# Patient Record
Sex: FEMALE | Race: WHITE | Employment: FULL TIME | URBAN - METROPOLITAN AREA
[De-identification: names, ages, dates, MRNs, and addresses within clinical notes are randomized per-mention and may not be internally consistent; named-entity substitution may affect disease eponyms.]

---

## 2018-01-26 ENCOUNTER — HOSPITAL ENCOUNTER (OUTPATIENT)
Dept: MAMMOGRAPHY | Age: 53
Discharge: HOME | End: 2018-01-26
Attending: OBSTETRICS & GYNECOLOGY | Admitting: OBSTETRICS & GYNECOLOGY

## 2018-01-26 DIAGNOSIS — Z12.31 ENCOUNTER FOR SCREENING MAMMOGRAM FOR MALIGNANT NEOPLASM OF BREAST: ICD-10-CM

## 2018-07-03 ENCOUNTER — TELEPHONE (OUTPATIENT)
Dept: PULMONOLOGY | Age: 53
End: 2018-07-03

## 2019-06-21 ENCOUNTER — HOSPITAL ENCOUNTER (OUTPATIENT)
Dept: WOMENS IMAGING | Age: 54
Discharge: HOME | End: 2019-06-21
Payer: COMMERCIAL

## 2019-06-21 DIAGNOSIS — Z12.31 VISIT FOR SCREENING MAMMOGRAM: ICD-10-CM

## 2019-06-21 PROCEDURE — 77063 BREAST TOMOSYNTHESIS BI: CPT

## 2020-08-17 ENCOUNTER — HOSPITAL ENCOUNTER (OUTPATIENT)
Dept: WOMENS IMAGING | Age: 55
Discharge: HOME | End: 2020-08-17
Payer: COMMERCIAL

## 2020-08-17 PROCEDURE — 77063 BREAST TOMOSYNTHESIS BI: CPT

## 2022-02-18 ENCOUNTER — OFFICE VISIT (OUTPATIENT)
Dept: FAMILY MEDICINE CLINIC | Age: 57
End: 2022-02-18
Payer: COMMERCIAL

## 2022-02-18 VITALS
HEART RATE: 69 BPM | WEIGHT: 160 LBS | OXYGEN SATURATION: 98 % | DIASTOLIC BLOOD PRESSURE: 86 MMHG | SYSTOLIC BLOOD PRESSURE: 137 MMHG | BODY MASS INDEX: 26.66 KG/M2 | HEIGHT: 65 IN

## 2022-02-18 DIAGNOSIS — Z23 NEED FOR SHINGLES VACCINE: ICD-10-CM

## 2022-02-18 DIAGNOSIS — Z12.11 SCREEN FOR COLON CANCER: ICD-10-CM

## 2022-02-18 DIAGNOSIS — Z13.29 SCREENING FOR THYROID DISORDER: ICD-10-CM

## 2022-02-18 DIAGNOSIS — Z00.00 ANNUAL PHYSICAL EXAM: ICD-10-CM

## 2022-02-18 DIAGNOSIS — F51.01 PRIMARY INSOMNIA: ICD-10-CM

## 2022-02-18 DIAGNOSIS — Z13.0 SCREENING FOR DISORDER OF BLOOD AND BLOOD-FORMING ORGANS: ICD-10-CM

## 2022-02-18 DIAGNOSIS — Z13.21 ENCOUNTER FOR VITAMIN DEFICIENCY SCREENING: ICD-10-CM

## 2022-02-18 DIAGNOSIS — Z12.31 ENCOUNTER FOR SCREENING MAMMOGRAM FOR MALIGNANT NEOPLASM OF BREAST: ICD-10-CM

## 2022-02-18 DIAGNOSIS — Z13.220 SCREENING FOR CHOLESTEROL LEVEL: ICD-10-CM

## 2022-02-18 DIAGNOSIS — H61.23 BILATERAL HEARING LOSS DUE TO CERUMEN IMPACTION: ICD-10-CM

## 2022-02-18 DIAGNOSIS — Z76.89 ENCOUNTER TO ESTABLISH CARE: Primary | ICD-10-CM

## 2022-02-18 DIAGNOSIS — Z13.1 SCREENING FOR DIABETES MELLITUS: ICD-10-CM

## 2022-02-18 PROCEDURE — 69210 REMOVE IMPACTED EAR WAX UNI: CPT | Performed by: NURSE PRACTITIONER

## 2022-02-18 PROCEDURE — 90750 HZV VACC RECOMBINANT IM: CPT | Performed by: NURSE PRACTITIONER

## 2022-02-18 PROCEDURE — 99386 PREV VISIT NEW AGE 40-64: CPT | Performed by: NURSE PRACTITIONER

## 2022-02-18 PROCEDURE — 36415 COLL VENOUS BLD VENIPUNCTURE: CPT | Performed by: NURSE PRACTITIONER

## 2022-02-18 PROCEDURE — 90471 IMMUNIZATION ADMIN: CPT | Performed by: NURSE PRACTITIONER

## 2022-02-18 ASSESSMENT — ENCOUNTER SYMPTOMS
SORE THROAT: 0
SHORTNESS OF BREATH: 0
EYE REDNESS: 0
COUGH: 0
COLOR CHANGE: 0
TROUBLE SWALLOWING: 0
EYE ITCHING: 0
RHINORRHEA: 0
APNEA: 0
CHOKING: 0
NAUSEA: 0
BACK PAIN: 0
EYE DISCHARGE: 0
GASTROINTESTINAL NEGATIVE: 1
SINUS PRESSURE: 0
DIARRHEA: 0
CONSTIPATION: 0
RESPIRATORY NEGATIVE: 1
CHEST TIGHTNESS: 0
BLOOD IN STOOL: 0
ALLERGIC/IMMUNOLOGIC NEGATIVE: 1
VOMITING: 0
PHOTOPHOBIA: 0
WHEEZING: 0
ABDOMINAL PAIN: 0
STRIDOR: 0
EYE PAIN: 0

## 2022-02-18 ASSESSMENT — PATIENT HEALTH QUESTIONNAIRE - PHQ9
1. LITTLE INTEREST OR PLEASURE IN DOING THINGS: 0
SUM OF ALL RESPONSES TO PHQ QUESTIONS 1-9: 0
SUM OF ALL RESPONSES TO PHQ9 QUESTIONS 1 & 2: 0
SUM OF ALL RESPONSES TO PHQ QUESTIONS 1-9: 0
2. FEELING DOWN, DEPRESSED OR HOPELESS: 0

## 2022-02-18 NOTE — PROGRESS NOTES
Frankie Kolb  YOB: 1965    Date of Service:  2/18/2022    Chief Complaint:   Frankie Kolb is a 60 y.o. female who presents for complete physical examination. HPI: Jorge Coronado presents to the office for a complete physical examination and to establish care. She admits to taking Ambien 10 mg nightly as needed for insomnia. She admits to her old OB/GYN prescribing it for her, but her OB/GYN retired about 5 years ago. She states she \"saved\" her Ambien and only has a few left. She has never tried anything besides Ambien for insomnia. She is not interested in trying anything else for insomnia. She is aware the office does not prescribe controlled substances.      Wt Readings from Last 3 Encounters:   02/18/22 160 lb (72.6 kg)     BP Readings from Last 3 Encounters:   02/18/22 137/86       Patient Active Problem List   Diagnosis    Insomnia       Care:  Health Maintenance   Topic Date Due    Depression Screen  Never done    Cervical cancer screen  Never done    Diabetes screen  Never done    Lipid screen  Never done    Colorectal Cancer Screen  Never done    Shingles Vaccine (2 of 2) 04/15/2022    Breast cancer screen  08/17/2022    DTaP/Tdap/Td vaccine (2 - Td or Tdap) 05/13/2029    Flu vaccine  Completed    COVID-19 Vaccine  Completed    Hepatitis A vaccine  Aged Out    Hepatitis B vaccine  Aged Out    Hib vaccine  Aged Out    Meningococcal (ACWY) vaccine  Aged Out    Pneumococcal 0-64 years Vaccine  Aged Out    Hepatitis C screen  Discontinued    HIV screen  Discontinued      Hx abnormal PAP: yes - 25 years ago; negative pap since then  Self-breast exams: yes  Previous DEXA scan: no  Exercise: no regular exercise  Diet: Moderately healthy  use of seat belts, use of home smoke detectors    Lipid panel:No results found for: CHOL, TRIG, HDL, LDLCALC, LDLDIRECT       Immunization History   Administered Date(s) Administered    COVID-19, Pfizer Purple top, DILUTE for use, 12+ yrs, 30mcg/0.3mL dose 02/13/2021, 03/06/2021, 11/04/2021    Influenza, MDCK Quadv, IM, PF (Flucelvax 2 yrs and older) 10/09/2019    Influenza, Quadv, IM, PF (6 mo and older Fluzone, Flulaval, Fluarix, and 3 yrs and older Afluria) 10/28/2021    Tdap (Boostrix, Adacel) 05/13/2019    Zoster Recombinant (Shingrix) 02/18/2022       No Known Allergies  No outpatient medications have been marked as taking for the 2/18/22 encounter (Office Visit) with TAURUS Mazariegos CNP. Past Medical History:   Diagnosis Date    Insomnia      Past Surgical History:   Procedure Laterality Date    WISDOM TOOTH EXTRACTION Bilateral 1981     Family History   Problem Relation Age of Onset    Diabetes Father     High Blood Pressure Sister      Social History     Socioeconomic History    Marital status:      Spouse name: Not on file    Number of children: Not on file    Years of education: Not on file    Highest education level: Not on file   Occupational History    Not on file   Tobacco Use    Smoking status: Never Smoker    Smokeless tobacco: Never Used   Vaping Use    Vaping Use: Never used   Substance and Sexual Activity    Alcohol use: Yes     Alcohol/week: 7.0 standard drinks     Types: 7 Cans of beer per week    Drug use: Not Currently    Sexual activity: Not on file   Other Topics Concern    Not on file   Social History Narrative    Not on file     Social Determinants of Health     Financial Resource Strain:     Difficulty of Paying Living Expenses: Not on file   Food Insecurity:     Worried About Running Out of Food in the Last Year: Not on file    Rogers of Food in the Last Year: Not on file   Transportation Needs:     Lack of Transportation (Medical): Not on file    Lack of Transportation (Non-Medical):  Not on file   Physical Activity:     Days of Exercise per Week: Not on file    Minutes of Exercise per Session: Not on file   Stress:     Feeling of Stress : Not on file   Social Connections:     Frequency of Communication with Friends and Family: Not on file    Frequency of Social Gatherings with Friends and Family: Not on file    Attends Yarsanism Services: Not on file    Active Member of Clubs or Organizations: Not on file    Attends Club or Organization Meetings: Not on file    Marital Status: Not on file   Intimate Partner Violence:     Fear of Current or Ex-Partner: Not on file    Emotionally Abused: Not on file    Physically Abused: Not on file    Sexually Abused: Not on file   Housing Stability:     Unable to Pay for Housing in the Last Year: Not on file    Number of [place] in the Last Year: Not on file    Unstable Housing in the Last Year: Not on file       Review of Systems:  Review of Systems   Constitutional: Negative. Negative for activity change, appetite change, chills, diaphoresis, fatigue, fever and unexpected weight change. HENT: Negative. Negative for ear pain, rhinorrhea, sinus pressure, sneezing, sore throat and trouble swallowing. Eyes: Negative for photophobia, pain, discharge, redness, itching and visual disturbance. Respiratory: Negative. Negative for apnea, cough, choking, chest tightness, shortness of breath, wheezing and stridor. Cardiovascular: Negative for chest pain, palpitations and leg swelling. Gastrointestinal: Negative. Negative for abdominal pain, blood in stool, constipation, diarrhea, nausea and vomiting. Genitourinary: Negative. Negative for decreased urine volume, difficulty urinating, dysuria, enuresis, flank pain, frequency, genital sores, hematuria and urgency. Musculoskeletal: Negative. Negative for arthralgias, back pain, gait problem, joint swelling, myalgias, neck pain and neck stiffness. Skin: Negative. Negative for color change, pallor, rash and wound. Allergic/Immunologic: Negative. Neurological: Negative. Negative for dizziness, facial asymmetry, weakness, light-headedness and headaches. Psychiatric/Behavioral: Positive for sleep disturbance. Negative for agitation, behavioral problems, confusion, decreased concentration, dysphoric mood, hallucinations, self-injury and suicidal ideas. The patient is not nervous/anxious and is not hyperactive. Physical Exam:   Vitals:    02/18/22 1335   BP: 137/86   Site: Right Upper Arm   Position: Sitting   Cuff Size: Medium Adult   Pulse: 69   SpO2: 98%   Weight: 160 lb (72.6 kg)   Height: 5' 5\" (1.651 m)     Body mass index is 26.63 kg/m². Physical Exam  Vitals and nursing note reviewed. Constitutional:       General: She is not in acute distress. Appearance: Normal appearance. She is well-developed. She is not diaphoretic. HENT:      Head: Normocephalic and atraumatic. Right Ear: Tympanic membrane, ear canal and external ear normal. There is no impacted cerumen. Left Ear: Tympanic membrane, ear canal and external ear normal. There is no impacted cerumen. Nose: Nose normal. No congestion or rhinorrhea. Mouth/Throat:      Mouth: Mucous membranes are moist.      Pharynx: Oropharynx is clear. No oropharyngeal exudate or posterior oropharyngeal erythema. Eyes:      General: No scleral icterus. Right eye: No discharge. Left eye: No discharge. Conjunctiva/sclera: Conjunctivae normal.   Neck:      Vascular: No carotid bruit. Trachea: No tracheal deviation. Cardiovascular:      Rate and Rhythm: Normal rate and regular rhythm. Pulses: Normal pulses. Heart sounds: Normal heart sounds. No murmur heard. No friction rub. No gallop. Pulmonary:      Effort: Pulmonary effort is normal. No respiratory distress. Breath sounds: Normal breath sounds. No stridor. No wheezing, rhonchi or rales. Chest:      Chest wall: No tenderness. Abdominal:      General: Bowel sounds are normal. There is no distension. Palpations: Abdomen is soft. There is no mass. Tenderness:  There is no abdominal tenderness. There is no guarding or rebound. Hernia: No hernia is present. Musculoskeletal:         General: No swelling, tenderness, deformity or signs of injury. Normal range of motion. Cervical back: Normal range of motion and neck supple. No rigidity. No muscular tenderness. Right lower leg: No edema. Left lower leg: No edema. Lymphadenopathy:      Cervical: No cervical adenopathy. Skin:     General: Skin is warm and dry. Capillary Refill: Capillary refill takes less than 2 seconds. Coloration: Skin is not jaundiced or pale. Findings: No bruising, erythema, lesion or rash. Neurological:      General: No focal deficit present. Mental Status: She is alert and oriented to person, place, and time. Mental status is at baseline. Cranial Nerves: No cranial nerve deficit. Sensory: No sensory deficit. Motor: No weakness or abnormal muscle tone. Coordination: Coordination normal.      Gait: Gait normal.      Deep Tendon Reflexes: Reflexes are normal and symmetric. Reflexes normal.   Psychiatric:         Mood and Affect: Mood normal.         Behavior: Behavior normal.         Thought Content: Thought content normal.         Judgment: Judgment normal.           Assessment/Plan:    Mike Dillard was seen today for establish care.     Diagnoses and all orders for this visit:    Encounter to establish care    Annual physical exam  -     CBC with Auto Differential  -     Comprehensive Metabolic Panel  -     Lipid Panel  -     Vitamin D 25 Hydroxy  -     Vitamin B12 & Folate  -     TSH with Reflex  -     Hemoglobin A1C    Bilateral hearing loss due to cerumen impaction  -     55086 - GA REMOVE IMPACTED EAR WAX    Primary insomnia    Need for shingles vaccine    Screening for disorder of blood and blood-forming organs  -     CBC with Auto Differential  -     Comprehensive Metabolic Panel    Screening for cholesterol level  -     Lipid Panel    Encounter for vitamin deficiency screening  -     Vitamin D 25 Hydroxy  -     Vitamin B12 & Folate    Screening for thyroid disorder  -     TSH with Reflex    Encounter for screening mammogram for malignant neoplasm of breast  -     PK DIGITAL SCREEN W OR WO CAD BILATERAL; Future    Screen for colon cancer  -     [facility]FL - Larisa Schreiber MD, Gastroenterology (ER[facility]), [facility]    Screening for diabetes mellitus  -     Hemoglobin A1C          Education:     Discussed using sunscreen for prevention of skin cancer and to reapply every 2 hours and after swimming, bathing, sweating. The nature of sun-induced photo-aging and skin cancers is discussed. Sunavoidance, protective clothing, and the use of 30-SPF sunscreens is advised. Observe closely for skin damage/changes, and call if such occurs. Discussed safe sex practices to prevent STI, HIV, andunplanned pregnancy. Discussed healthy eating habits and to exercise 5 days a week to elevate heart rate for 30-45 minutes at a time     Healthy Eating information given to patient in office and in AVS:   Eating healthy foods can help lower your risk for disease. Healthy foodgives you energy and keeps your heart strong, your brain active, your muscles working, and your bones strong. A healthy diet includes a variety of foods from the basic food groups: grains, vegetables, fruits, milk andmilk products, and meat and beans. Some people may eat more of their favorite foods from only one food group and, as a result, miss getting the nutrients they need. So, it is important to pay attention not only to what youeat but also to what you are missing from your diet. You can eat a healthy, balanced diet by making a few small changes. Follow-up care is a key part of yourtreatment and safety. Be sure to make and go to all appointments, and call your doctor if you are having problems. It's also a good idea to know your test results and keep a list of the medicines you take.   How can you care for yourself at home? Look at what you eat  · Keep a food diary for a week or two and record everything you eat or drink. Track the number of servings you eat from each food group. · For a balanced dietevery day, eat a variety of:  ¨ 6 or more ounce-equivalents of grains, such as cereals, breads,crackers, rice, or pasta, every day. An ounce-equivalent is 1 slice of bread, 1 cup of ready-to-eat cereal, or ½ cup of cooked rice, cooked pasta, or cooked cereal.  ¨ 2½ cups ofvegetables, especially:  § Dark-green vegetables such as broccoli and spinach. § Orange vegetables such as carrots and sweet potatoes. § Dry beans (such as ferrera and kidney beans) and peas (such as lentils). ¨ 2 cups of fresh, frozen, or canned fruit. A small apple or 1 banana or orange equals 1 cup. ¨ 3cups of nonfat or low-fat milk, yogurt, or other milk products. ¨ 5½ ounces of meat and beans, such as chicken, fish, lean meat, beans, nuts, and seeds. One egg, 1 tablespoon ofpeanut butter, ½ ounce nuts or seeds, or ¼ cup of cooked beans equals 1 ounce of meat. · Learnhow to read food labels for serving sizes and ingredients. Fast-food and convenience-food meals often contain few or no fruits or vegetables. Make sure you eat some fruits and vegetables to make the meal more nutritious. · Look at your food diary. For each food group, add up what you have eaten and then divide the total by the number of days. This will give you an idea of how much you are eating from [address] group. See if you can find some ways to change your diet to make it more healthy. Start small  · Do not try to make dramatic changes to your diet all at once. You might feel that you are missing out on your favoritefoods and then be more likely to fail. · Start slowly, and gradually change your habits. Try some of the following:  ¨ Use whole wheat bread instead of white bread. ¨ Use nonfat or low-fat milk instead of whole milk.   ¨ Eat brown rice instead of white rice, and eat whole wheat pasta instead of white-flour pasta. ¨ Try low-fat cheeses and low-fat yogurt. ¨ Add more fruits and vegetables to meals and have them for snacks. ¨ Add lettuce, tomato, cucumber, and onion to sandwiches. ¨ Add fruitto yogurt and cereal.  Enjoy food  · You can still eat your favorite foods. You just may need to eat less of them. If your favorite foods are high in fat, salt, and sugar, limit how often you eat them, but do not cut themout entirely. · Eat a wide variety of foods. Make healthy choices when eatingout  · The type of restaurant you choose can help you make healthy choices. Even fast-food chains arenow offering more low-fat or healthier choices on the menu. · Choose smaller portions, or take half of your meal home. · When eating out, try:  ¨ A veggie pizza with a whole wheat crust or grilled chicken (instead of sausage or pepperoni). ¨ Pasta with roasted vegetables, grilled chicken, or marinara sauce instead of cream sauce. ¨ A vegetable wrap or grilled chicken wrap. ¨ Broiled or poached food instead of fried or breaded items. Make healthy choiceseasy  · Buy packaged, prewashed, ready-to-eat fresh vegetables and fruits, such as baby carrots, saladmixes, and chopped or shredded broccoli and cauliflower. · Buy packaged, presliced fruits, such as melon or pineapple. · Choose 100% fruit orvegetable juice instead of soda. Limit juice intake to 4 to 6 oz (½ to ¾ cup) a day. Education given to patient in office and in AVS on Physical Activity:     The types of physical activity that can help you get fit and stay healthy include:  · Aerobic or \"cardio\" activities that make your heart beat faster and makeyou breathe harder, such as brisk walking, riding a bike, or running. Aerobic activities strengthen your heart and lungs and build up your endurance.   · Strength training activities that make your muscles work against, or \"resist,\" something, such as lifting weights or doing push-ups. These activities help tone and strengthen yourmuscles. · Stretches that allow you to move your joints and muscles through their full range of motion. Stretching helps you be more flexible and avoid injury. What are the benefits of physical activity? Being active is one of the best things you can do to get fit and stay healthy. It helps you to:  · Feelstronger and have more energy to do all the things you like to do. · Focus better at school or work and perform better in sports. · Feel, think,and sleep better. · Reach and stay at a healthy weight. · Lose fat and build lean muscle. · Lower yourrisk for serious health problems. · Keep your bones, muscles, and joints strong. Being fit lets you do more physicalactivity. And it lets you work out harder without as much effort. How can you make physical activity part of your life? Get at least 30 minutesof exercise on most days of the week. Walking is a good choice. You also may want to do other activities, such as running, swimming, cycling, or playing tennis or team sports. Pick activities that you like--ones that make your heart beat faster, your muscles stronger, and your muscles and joints more flexible. If you find more than one thing you like doing, do them all. You don't have to do the samething every day. Get your heart pumping every day. Any activity that makes your heart beat faster and keeps it at that rate for a while counts. are some great ways to get your heart beating faster:  · Go for a brisk walk, run, or bike ride. · Go for a hike or swim. · Go in-line skating. · Play a game of touch football, basketball, or soccer. · Ride a bike. · Play tennis or racquetball. · Climb stairs. Even some household chores can be aerobic--just do them at a faster pace. Vacuuming, raking or mowing the lawn, sweeping the garage, and washing and waxing the carall can help get your heart rate up. Strengthen your muscles during the week.  You don't have to lift heavy weights or grow big, bulky muscles toget stronger. Doing a few simple activities that make your muscles work against, or \"resist,\" something can help you get stronger. For example, you can:  · Do push-ups or sit-ups, which use your own body weight as resistance. · Lift weights or dumbbells or use stretch bands at homeor in a gym or community center. Stretch your muscles often. Stretching will help you as you become moreactive. It can help you stay flexible, loosen tight muscles, and avoid injury. It can also help improve your balance and posture and can be a great way to relax. Be sure to stretch the muscles you'll be using when [facility] out. It's best to warm your muscles slightly before you stretch them. Walk or do some other light aerobic activity for a few minutes, and then start stretching. When you stretch your muscles:  · Do it slowly. Stretching is not about going fast or making sudden movements. · Don't push or bounce during a stretch. · Hold each stretch for at least 15 to 30 seconds, if you can. You should feel a stretch in the muscle, but not pain. · Breathe out as you do the stretch. Then breathe in as you hold the stretch. Don't hold your breath. If you're worried about how more activitymight affect your health, have a checkup before you start. Follow any special advice your doctor gives you for getting a smart start.

## 2022-02-18 NOTE — PATIENT INSTRUCTIONS
diet and sunshine. · Get regular bone-building exercise. Walking, jogging, dancing, and lifting weights can make your bones stronger. · Limit alcohol to 2 drinks a day for men and 1 drink a day for women. · Don't smoke. Smoking can make bones thin faster. If you need help quitting, talk to your doctor about stop-smoking programs and medicines. These can increase your chances of quitting for good. When should you call for help? Watch closely for changes in your health, and be sure to contact your doctor if you have any problems. Where can you learn more? Go to [email or web address]://pi[employer]GO Technologiespepiceweb.[employer]. org and sign in to your [employer] account. Enter B147 in the [employer] box to learn more about \"Preventing Osteoporosis: Care Instructions. \"     If you do not have an account, please click on the \"Sign Up Now\" link. Current as of: September 8, 2021               Content Version: 13.1  © 2006-2021 [employer]. Care instructions adapted under license by [employer]. If you have questions about a medical condition or this instruction, always ask your healthcare professional. [place] [address] any warranty or liability for your use of this information. Patient Education        Recombinant Zoster (Shingles) Vaccine: What You Need to Know  Why get vaccinated? Recombinant zoster (shingles) vaccine can prevent shingles. Shingles (also called herpes zoster, or just zoster) is a painful skin rash, usually with blisters. In addition to the rash, shingles can cause fever, headache, chills, or upset stomach. More rarely, shingles can lead to pneumonia, hearing problems, blindness, brain inflammation (encephalitis), or death. The most common complication of shingles is long-term nerve pain called postherpetic neuralgia (PHN). PHN occurs in the areas where the shingles rash was, even after the rash clears up. It can last for months or years after the rash goes away.  The pain from PHN can be severe and debilitating. About 10 to 18% of people who get shingles will experience PHN. The risk of PHN increases with age. An older adult with shingles is more likely to develop PHN and have longer lasting and more severe pain than a younger person with shingles. Shingles is caused by the varicella zoster virus, the same virus that causes chickenpox. After you have chickenpox, the virus stays in your body and can cause shingles later in life. Shingles cannot be passed from one person to another, but the virus that causes shingles can spread and cause chickenpox in someone who had never had chickenpox or received chickenpox vaccine. Recombinant shingles vaccine  Recombinant shingles vaccine provides strong protection against shingles. By preventing shingles, recombinant shingles vaccine also protects against PHN. Recombinant shingles vaccine is the preferred vaccine for the prevention of shingles. However, a different vaccine, live shingles vaccine, may be used in some circumstances. The recombinant shingles vaccine is recommended for adults 50 years and older without serious immune problems. It is given as a two-dose series. This vaccine is also recommended for people who have already gotten another type of shingles vaccine, the live shingles vaccine. There is no live virus in this vaccine. Shingles vaccine may be given at the same time as other vaccines. Talk with your health care provider  Tell your vaccine provider if the person getting the vaccine:  · Has had an allergic reaction after a previous dose of recombinant shingles vaccine, or has any severe, life-threatening allergies. · Is pregnant or breastfeeding. · Is currently experiencing an episode of shingles. In some cases, your health care provider may decide to postpone shingles vaccination to a future visit. People with minor illnesses, such as a cold, may be vaccinated.  People who are moderately or severely ill should usually wait until they recover before getting recombinant shingles vaccine. Your health care provider can give you more information. Risks of a vaccine reaction  · A sore arm with mild or moderate pain is very common after recombinant shingles vaccine, affecting about 80% of vaccinated people. Redness and swelling can also happen at the site of the injection. · Tiredness, muscle pain, headache, shivering, fever, stomach pain, and nausea happen after vaccination in more than half of people who receive recombinant shingles vaccine. In clinical trials, about 1 out of 6 people who got recombinant zoster vaccine experienced side effects that prevented them from doing regular activities. Symptoms usually went away on their own in 2 to 3 days. You should still get the second dose of recombinant zoster vaccine even if you had one of these reactions after the first dose. People sometimes faint after medical procedures, including vaccination. Tell your provider if you feel dizzy or have vision changes or ringing in the ears. As with any medicine, there is a very remote chance of a vaccine causing a severe allergic reaction, other serious injury, or death. What if there is a serious problem? An allergic reaction could occur after the vaccinated person leaves the clinic. If you see signs of a severe allergic reaction (hives, swelling of the face and throat, difficulty breathing, a fast heartbeat, dizziness, or weakness), call 9-1-1 and get the person to the nearest hospital.  For other signs that concern you, call your health care provider. Adverse reactions should be reported to the Vaccine Adverse Event Reporting System (VAERS). Your health care provider will usually file this report, or you can do it yourself. Visit the VAERS website at www.vaers. hhs.gov or call 0-696.560.2968. VAERS is only for reporting reactions, and VAERS staff do not give medical advice. How can I learn more?   · Ask your health care provider. · Call your local or state health department. · Contact the Centers for Disease Control and Prevention (CDC):  ? Call 7-110.624.4530 (1-800-CDC-INFO) or  ? Visit CDC's website at www.cdc.gov/vaccines  Vaccine Information Statement  Recombinant Zoster Vaccine  10/30/2019  Department of [facility] and [facility] Center for Disease Control and Prevention  Many Vaccine Information Statements are available in [place] and other languages. See www.immunize.org/vis. Hojas de Información Sobre Vacunas están disponibles en Español y en muchos otros idiomas. Visite [place].si   Care instructions adapted under license by [facility] ([facility]). If you have questions about a medical condition or this instruction, always ask your healthcare professional. [address] any warranty or liability for your use of this information.

## 2022-02-19 LAB
A/G RATIO: 1.5 (ref 1.1–2.2)
ALBUMIN SERPL-MCNC: 4.8 G/DL (ref 3.4–5)
ALP BLD-CCNC: 73 U/L (ref 40–129)
ALT SERPL-CCNC: 17 U/L (ref 10–40)
ANION GAP SERPL CALCULATED.3IONS-SCNC: 12 MMOL/L (ref 3–16)
AST SERPL-CCNC: 19 U/L (ref 15–37)
BASOPHILS ABSOLUTE: 0 K/UL (ref 0–0.2)
BASOPHILS RELATIVE PERCENT: 0.6 %
BILIRUB SERPL-MCNC: 0.3 MG/DL (ref 0–1)
BUN BLDV-MCNC: 12 MG/DL (ref 7–20)
CALCIUM SERPL-MCNC: 9.8 MG/DL (ref 8.3–10.6)
CHLORIDE BLD-SCNC: 101 MMOL/L (ref 99–110)
CHOLESTEROL, TOTAL: 220 MG/DL (ref 0–199)
CO2: 26 MMOL/L (ref 21–32)
CREAT SERPL-MCNC: 0.8 MG/DL (ref 0.6–1.1)
EOSINOPHILS ABSOLUTE: 0.2 K/UL (ref 0–0.6)
EOSINOPHILS RELATIVE PERCENT: 3.3 %
ESTIMATED AVERAGE GLUCOSE: 99.7 MG/DL
FOLATE: 17.03 NG/ML (ref 4.78–24.2)
GFR AFRICAN AMERICAN: >60
GFR NON-AFRICAN AMERICAN: >60
GLUCOSE BLD-MCNC: 93 MG/DL (ref 70–99)
HBA1C MFR BLD: 5.1 %
HCT VFR BLD CALC: 43 % (ref 36–48)
HDLC SERPL-MCNC: 68 MG/DL (ref 40–60)
HEMOGLOBIN: 14.7 G/DL (ref 12–16)
LDL CHOLESTEROL CALCULATED: 133 MG/DL
LYMPHOCYTES ABSOLUTE: 1.6 K/UL (ref 1–5.1)
LYMPHOCYTES RELATIVE PERCENT: 29.7 %
MCH RBC QN AUTO: 31.8 PG (ref 26–34)
MCHC RBC AUTO-ENTMCNC: 34.2 G/DL (ref 31–36)
MCV RBC AUTO: 93.1 FL (ref 80–100)
MONOCYTES ABSOLUTE: 0.5 K/UL (ref 0–1.3)
MONOCYTES RELATIVE PERCENT: 9.6 %
NEUTROPHILS ABSOLUTE: 3.2 K/UL (ref 1.7–7.7)
NEUTROPHILS RELATIVE PERCENT: 56.8 %
PDW BLD-RTO: 13.1 % (ref 12.4–15.4)
PLATELET # BLD: 259 K/UL (ref 135–450)
PMV BLD AUTO: 8.5 FL (ref 5–10.5)
POTASSIUM SERPL-SCNC: 4 MMOL/L (ref 3.5–5.1)
RBC # BLD: 4.61 M/UL (ref 4–5.2)
SODIUM BLD-SCNC: 139 MMOL/L (ref 136–145)
TOTAL PROTEIN: 7.9 G/DL (ref 6.4–8.2)
TRIGL SERPL-MCNC: 94 MG/DL (ref 0–150)
TSH REFLEX: 1.05 UIU/ML (ref 0.27–4.2)
VITAMIN B-12: 549 PG/ML (ref 211–911)
VITAMIN D 25-HYDROXY: 18.5 NG/ML
VLDLC SERPL CALC-MCNC: 19 MG/DL
WBC # BLD: 5.6 K/UL (ref 4–11)

## 2022-02-20 DIAGNOSIS — E55.9 VITAMIN D DEFICIENCY: Primary | ICD-10-CM

## 2022-02-21 DIAGNOSIS — E55.9 VITAMIN D DEFICIENCY: ICD-10-CM

## 2022-02-21 DIAGNOSIS — E78.49 OTHER HYPERLIPIDEMIA: ICD-10-CM

## 2022-04-22 ENCOUNTER — HOSPITAL ENCOUNTER (OUTPATIENT)
Dept: WOMENS IMAGING | Age: 57
Discharge: HOME | End: 2022-04-22
Payer: COMMERCIAL

## 2022-04-22 VITALS — HEIGHT: 65 IN | BODY MASS INDEX: 25.83 KG/M2 | WEIGHT: 155 LBS

## 2022-04-22 DIAGNOSIS — Z12.31 ENCOUNTER FOR SCREENING MAMMOGRAM FOR MALIGNANT NEOPLASM OF BREAST: ICD-10-CM

## 2022-04-22 PROCEDURE — 77067 SCR MAMMO BI INCL CAD: CPT

## 2022-05-06 ENCOUNTER — NURSE ONLY (OUTPATIENT)
Dept: FAMILY MEDICINE CLINIC | Age: 57
End: 2022-05-06
Payer: COMMERCIAL

## 2022-05-06 DIAGNOSIS — Z23 NEED FOR SHINGLES VACCINE: Primary | ICD-10-CM

## 2022-05-06 PROCEDURE — 90750 HZV VACC RECOMBINANT IM: CPT | Performed by: NURSE PRACTITIONER

## 2022-05-06 PROCEDURE — 90471 IMMUNIZATION ADMIN: CPT | Performed by: NURSE PRACTITIONER

## 2022-08-04 DIAGNOSIS — E55.9 VITAMIN D DEFICIENCY: ICD-10-CM

## 2022-08-04 RX ORDER — METHOCARBAMOL 750 MG/1
TABLET ORAL
Qty: 12 CAPSULE | Refills: 1 | OUTPATIENT
Start: 2022-08-04

## 2022-08-04 NOTE — TELEPHONE ENCOUNTER
Only needed weekly vitamin D for a total of 6 months and then may start on vitamin D3 supplement of 1,000 to 2,000 units daily

## 2023-05-22 ENCOUNTER — HOSPITAL ENCOUNTER (OUTPATIENT)
Dept: WOMENS IMAGING | Age: 58
Discharge: HOME | End: 2023-05-22
Payer: COMMERCIAL

## 2023-05-22 VITALS — HEIGHT: 65 IN | WEIGHT: 140 LBS | BODY MASS INDEX: 23.32 KG/M2

## 2023-05-22 DIAGNOSIS — Z12.31 VISIT FOR SCREENING MAMMOGRAM: ICD-10-CM

## 2023-05-22 PROCEDURE — 77063 BREAST TOMOSYNTHESIS BI: CPT

## 2024-07-29 ENCOUNTER — HOSPITAL ENCOUNTER (OUTPATIENT)
Dept: WOMENS IMAGING | Age: 59
Discharge: HOME | End: 2024-07-29
Payer: COMMERCIAL

## 2024-07-29 VITALS — HEIGHT: 65 IN | WEIGHT: 150 LBS | BODY MASS INDEX: 24.99 KG/M2

## 2024-07-29 DIAGNOSIS — Z12.31 VISIT FOR SCREENING MAMMOGRAM: ICD-10-CM

## 2024-07-29 PROCEDURE — 77063 BREAST TOMOSYNTHESIS BI: CPT

## 2025-02-24 ENCOUNTER — OFFICE VISIT (OUTPATIENT)
Dept: FAMILY MEDICINE CLINIC | Age: 60
End: 2025-02-24
Payer: COMMERCIAL

## 2025-02-24 VITALS
OXYGEN SATURATION: 98 % | HEART RATE: 81 BPM | SYSTOLIC BLOOD PRESSURE: 122 MMHG | BODY MASS INDEX: 25.33 KG/M2 | WEIGHT: 152 LBS | DIASTOLIC BLOOD PRESSURE: 82 MMHG | HEIGHT: 65 IN

## 2025-02-24 DIAGNOSIS — Z13.29 SCREENING FOR THYROID DISORDER: ICD-10-CM

## 2025-02-24 DIAGNOSIS — Z13.0 SCREENING FOR DISORDER OF BLOOD AND BLOOD-FORMING ORGANS: ICD-10-CM

## 2025-02-24 DIAGNOSIS — Z00.00 ANNUAL PHYSICAL EXAM: ICD-10-CM

## 2025-02-24 DIAGNOSIS — E55.9 VITAMIN D DEFICIENCY: ICD-10-CM

## 2025-02-24 DIAGNOSIS — G43.709 CHRONIC MIGRAINE WITHOUT AURA WITHOUT STATUS MIGRAINOSUS, NOT INTRACTABLE: ICD-10-CM

## 2025-02-24 DIAGNOSIS — Z13.21 ENCOUNTER FOR VITAMIN DEFICIENCY SCREENING: ICD-10-CM

## 2025-02-24 DIAGNOSIS — Z13.1 SCREENING FOR DIABETES MELLITUS: ICD-10-CM

## 2025-02-24 DIAGNOSIS — Z13.220 SCREENING FOR CHOLESTEROL LEVEL: ICD-10-CM

## 2025-02-24 DIAGNOSIS — Z12.11 SCREEN FOR COLON CANCER: ICD-10-CM

## 2025-02-24 DIAGNOSIS — Z76.89 ENCOUNTER TO ESTABLISH CARE: Primary | ICD-10-CM

## 2025-02-24 DIAGNOSIS — H61.21 IMPACTED CERUMEN OF RIGHT EAR: ICD-10-CM

## 2025-02-24 DIAGNOSIS — Z23 NEED FOR INFLUENZA VACCINATION: ICD-10-CM

## 2025-02-24 PROCEDURE — 90661 CCIIV3 VAC ABX FR 0.5 ML IM: CPT | Performed by: NURSE PRACTITIONER

## 2025-02-24 PROCEDURE — 90471 IMMUNIZATION ADMIN: CPT | Performed by: NURSE PRACTITIONER

## 2025-02-24 PROCEDURE — 69210 REMOVE IMPACTED EAR WAX UNI: CPT | Performed by: NURSE PRACTITIONER

## 2025-02-24 PROCEDURE — 99386 PREV VISIT NEW AGE 40-64: CPT | Performed by: NURSE PRACTITIONER

## 2025-02-24 RX ORDER — METOCLOPRAMIDE 10 MG/1
10 TABLET ORAL DAILY PRN
Qty: 90 TABLET | Refills: 2 | Status: SHIPPED | OUTPATIENT
Start: 2025-02-24

## 2025-02-24 SDOH — ECONOMIC STABILITY: FOOD INSECURITY: WITHIN THE PAST 12 MONTHS, YOU WORRIED THAT YOUR FOOD WOULD RUN OUT BEFORE YOU GOT MONEY TO BUY MORE.: NEVER TRUE

## 2025-02-24 SDOH — ECONOMIC STABILITY: FOOD INSECURITY: WITHIN THE PAST 12 MONTHS, THE FOOD YOU BOUGHT JUST DIDN'T LAST AND YOU DIDN'T HAVE MONEY TO GET MORE.: NEVER TRUE

## 2025-02-24 ASSESSMENT — ENCOUNTER SYMPTOMS
ALLERGIC/IMMUNOLOGIC NEGATIVE: 1
SINUS PAIN: 0
VOICE CHANGE: 0
BLOOD IN STOOL: 0
COUGH: 0
NAUSEA: 0
GASTROINTESTINAL NEGATIVE: 1
ABDOMINAL PAIN: 0
VOMITING: 0
FACIAL SWELLING: 0
STRIDOR: 0
ANAL BLEEDING: 0
CONSTIPATION: 0
DIARRHEA: 0
RECTAL PAIN: 0
WHEEZING: 0
RHINORRHEA: 0
SHORTNESS OF BREATH: 0
SORE THROAT: 0
APNEA: 0
SINUS PRESSURE: 0
EYE PAIN: 0
CHOKING: 0
CHEST TIGHTNESS: 0
COLOR CHANGE: 0
RESPIRATORY NEGATIVE: 1
EYE ITCHING: 0
EYE DISCHARGE: 0
ABDOMINAL DISTENTION: 0
BACK PAIN: 0
TROUBLE SWALLOWING: 0
EYE REDNESS: 0
PHOTOPHOBIA: 0

## 2025-02-24 ASSESSMENT — PATIENT HEALTH QUESTIONNAIRE - PHQ9
SUM OF ALL RESPONSES TO PHQ QUESTIONS 1-9: 0
SUM OF ALL RESPONSES TO PHQ QUESTIONS 1-9: 0
2. FEELING DOWN, DEPRESSED OR HOPELESS: NOT AT ALL
1. LITTLE INTEREST OR PLEASURE IN DOING THINGS: NOT AT ALL
SUM OF ALL RESPONSES TO PHQ QUESTIONS 1-9: 0
SUM OF ALL RESPONSES TO PHQ QUESTIONS 1-9: 0
SUM OF ALL RESPONSES TO PHQ9 QUESTIONS 1 & 2: 0

## 2025-02-24 NOTE — PROGRESS NOTES
Carol Fair (:  1965) is a 59 y.o. female, here for evaluation of the following chief complaint(s):  Annual Exam (Pt is here for annual exam. )         Assessment & Plan  1. Health maintenance.  Her blood pressure readings are within the normal range at 122/82. She is advised to incorporate resistance exercises into her routine for bone health. A comprehensive set of physical labs will be conducted today to assess her glucose levels, kidney and liver function, electrolyte balance, blood count, thyroid function, and vitamin levels, including magnesium. She is advised to take Caltrate daily for bone health. A Pap smear will be scheduled in the future. She is encouraged to continue her annual mammograms. A screening colonoscopy will be ordered, and she will be provided with the necessary information. She is advised to apply sunscreen when outdoors for more than 15 to 20 minutes. An influenza vaccine will be administered today. Cerumen removal from the right ear will be performed during this visit.    2. Migraines.  She has a history of migraines triggered by certain foods. She is advised to call the office for a refill of her Reglan prescription if needed. She takes Ibuprofen 800 mg and a Reglan 10 mg once as needed for migraines.  She may get 1-2 migraines a month.     4. Insomnia.  She occasionally takes trazodone for sleep. She is advised to inform the office of the dosage so that a refill can be provided.    PROCEDURE  Cerumen removal from the right ear was performed during this visit.    Results    1. Encounter to establish care  2. Annual physical exam  -     CBC with Auto Differential  -     Comprehensive Metabolic Panel  -     Lipid Panel  -     Magnesium  -     Vitamin D 25 Hydroxy  -     Vitamin B12 & Folate  -     TSH reflex to FT4  3. Vitamin D deficiency  -     Vitamin D 25 Hydroxy  4. Impacted cerumen of right ear  -     REMOVE IMPACTED EAR WAX  5. Chronic migraine without aura without status

## 2025-02-24 NOTE — PROGRESS NOTES
Subjective:     Patient presents for evaluation of a plugged ear. Patient noticed the symptoms in the left ear, several weeks ago.   Patient denies ear pain.    Patient's medications, allergies, past medical, surgical, social and family histories were reviewed and updated as appropriate.    Review of Systems  Pertinent items are noted in HPI.      Objective:     Vitals:    02/24/25 1007   BP: 122/82   Site: Right Upper Arm   Position: Sitting   Cuff Size: Medium Adult   Pulse: 81   SpO2: 98%   Weight: 68.9 kg (152 lb)   Height: 1.651 m (5' 5\")       General: alert, appears stated age, and cooperative   Right Ear: Cerumen impaction   Left Ear:  normal appearance   After removal: normal bilaterally         Assessment:      Cerumen Impaction, without otitis externa.      Plan:      Cerumen removed by flushing after use of wax softener and currettage.  Care instructions given.  Home treatment: none.  Follow up as needed.

## 2025-02-24 NOTE — PATIENT INSTRUCTIONS
Not feeling your best?  Where to go for the right care at the right time.    Dear Carol Fair   I wanted to provide you with some information that might help you seek care for your condition when your primary care provider or specialist is unavailable. If you have a need outside of normal business hours, you should first contact your primary care office or specialist caring for your condition. They may have on-call providers that could assist with your care. During office hours, you may request a virtual or same day appointment.   But what if your primary care provider is not in the office that day and you can't wait until the  next day for care? In that situation, your next option is to visit an urgent care facility.          [facility] now has urgent care sites open to support our community.   [facility] is a great alternative when you need immediate medical care that is not a serious threat to your health or your doctor's office is closed or unable to get you in for an appointment. The urgent care centers offer fast access to [facility] doctors for minor illnesses and injuries for patients of all ages. There are other medical services available including lab testing, X-rays, EKGs, and IV fluids.  Locations are open daily from 8 a.m. - 8 p.m.     [facility]  [address], [place], [place] [address]  [phone]    [address], [place], [place] [address]  [phone]    Local Urgent Care     [facility] 8:30 am - 7:70 pm   [address] [place] [address]  [phone]    [facility]     8 am - 8 pm   [address] [address]  [phone]    [facility] [place] 7 am - 7:30 pm  [address] [place] [address]  [phone]     [facility] / [place] 7 am - 7:30 pm  [address] [place] [address]  [phone]    [place]

## 2025-02-25 LAB
25(OH)D3 SERPL-MCNC: 30.1 NG/ML
ALBUMIN SERPL-MCNC: 4.7 G/DL (ref 3.4–5)
ALBUMIN/GLOB SERPL: 1.7 {RATIO} (ref 1.1–2.2)
ALP SERPL-CCNC: 71 U/L (ref 40–129)
ALT SERPL-CCNC: 22 U/L (ref 10–40)
ANION GAP SERPL CALCULATED.3IONS-SCNC: 13 MMOL/L (ref 3–16)
AST SERPL-CCNC: 23 U/L (ref 15–37)
BASOPHILS # BLD: 0 K/UL (ref 0–0.2)
BASOPHILS NFR BLD: 0.5 %
BILIRUB SERPL-MCNC: 0.5 MG/DL (ref 0–1)
BUN SERPL-MCNC: 13 MG/DL (ref 7–20)
CALCIUM SERPL-MCNC: 10 MG/DL (ref 8.3–10.6)
CHLORIDE SERPL-SCNC: 100 MMOL/L (ref 99–110)
CHOLEST SERPL-MCNC: 233 MG/DL (ref 0–199)
CO2 SERPL-SCNC: 26 MMOL/L (ref 21–32)
CREAT SERPL-MCNC: 0.9 MG/DL (ref 0.6–1.2)
DEPRECATED RDW RBC AUTO: 12.8 % (ref 12.4–15.4)
EOSINOPHIL # BLD: 0.3 K/UL (ref 0–0.6)
EOSINOPHIL NFR BLD: 3.6 %
EST. AVERAGE GLUCOSE BLD GHB EST-MCNC: 93.9 MG/DL
FOLATE SERPL-MCNC: 9.18 NG/ML (ref 4.78–24.2)
GFR SERPLBLD CREATININE-BSD FMLA CKD-EPI: 73 ML/MIN/{1.73_M2}
GLUCOSE SERPL-MCNC: 91 MG/DL (ref 70–99)
HBA1C MFR BLD: 4.9 %
HCT VFR BLD AUTO: 45.1 % (ref 36–48)
HDLC SERPL-MCNC: 69 MG/DL (ref 40–60)
HGB BLD-MCNC: 15.4 G/DL (ref 12–16)
LDLC SERPL CALC-MCNC: 141 MG/DL
LYMPHOCYTES # BLD: 1.4 K/UL (ref 1–5.1)
LYMPHOCYTES NFR BLD: 19.3 %
MAGNESIUM SERPL-MCNC: 2.26 MG/DL (ref 1.8–2.4)
MCH RBC QN AUTO: 32.3 PG (ref 26–34)
MCHC RBC AUTO-ENTMCNC: 34.3 G/DL (ref 31–36)
MCV RBC AUTO: 94.4 FL (ref 80–100)
MONOCYTES # BLD: 0.6 K/UL (ref 0–1.3)
MONOCYTES NFR BLD: 7.9 %
NEUTROPHILS # BLD: 4.9 K/UL (ref 1.7–7.7)
NEUTROPHILS NFR BLD: 68.7 %
PLATELET # BLD AUTO: 241 K/UL (ref 135–450)
PMV BLD AUTO: 8.2 FL (ref 5–10.5)
POTASSIUM SERPL-SCNC: 4.5 MMOL/L (ref 3.5–5.1)
PROT SERPL-MCNC: 7.4 G/DL (ref 6.4–8.2)
RBC # BLD AUTO: 4.78 M/UL (ref 4–5.2)
SODIUM SERPL-SCNC: 139 MMOL/L (ref 136–145)
TRIGL SERPL-MCNC: 116 MG/DL (ref 0–150)
TSH SERPL DL<=0.005 MIU/L-ACNC: 0.59 UIU/ML (ref 0.27–4.2)
VIT B12 SERPL-MCNC: 486 PG/ML (ref 211–911)
VLDLC SERPL CALC-MCNC: 23 MG/DL
WBC # BLD AUTO: 7.1 K/UL (ref 4–11)